# Patient Record
Sex: FEMALE | Race: WHITE
[De-identification: names, ages, dates, MRNs, and addresses within clinical notes are randomized per-mention and may not be internally consistent; named-entity substitution may affect disease eponyms.]

---

## 2019-02-18 ENCOUNTER — HOSPITAL ENCOUNTER (OUTPATIENT)
Dept: HOSPITAL 82 - LAB | Age: 69
Discharge: HOME | End: 2019-02-18
Attending: INTERNAL MEDICINE
Payer: MEDICARE

## 2019-02-18 DIAGNOSIS — E78.2: Primary | ICD-10-CM

## 2019-02-18 DIAGNOSIS — I10: ICD-10-CM

## 2019-02-18 DIAGNOSIS — Z12.11: ICD-10-CM

## 2019-02-18 LAB
ALBUMIN SERPL-MCNC: 4 G/DL (ref 3.2–5)
ALP SERPL-CCNC: 71 U/L (ref 38–126)
ANION GAP SERPL CALCULATED.3IONS-SCNC: 14 MMOL/L
AST SERPL-CCNC: 22 U/L (ref 9–36)
BUN SERPL-MCNC: 25 MG/DL (ref 8–23)
BUN/CREAT SERPL: 27
CHLORIDE SERPL-SCNC: 103 MMOL/L (ref 95–108)
CHOLEST SERPL-MCNC: 152 MG/DL (ref 0–199)
CHOLEST/HDLC SERPL: 3.3 {RATIO}
CO2 SERPL-SCNC: 28 MMOL/L (ref 22–30)
CREAT SERPL-MCNC: 0.9 MG/DL (ref 0.5–1)
HDLC SERPL-MCNC: 46 MG/DL (ref 40–?)
LDLC SERPL CALC-MCNC: 80 MG/DL
POTASSIUM SERPL-SCNC: 3.6 MMOL/L (ref 3.5–5.1)
PROT SERPL-MCNC: 7 G/DL (ref 6.3–8.2)
SODIUM SERPL-SCNC: 142 MMOL/L (ref 137–146)
TRIGL SERPL-MCNC: 130 MG/DL (ref 30–149)
VLDLC SERPL CALC-MCNC: 26 MG/DL

## 2019-03-06 ENCOUNTER — HOSPITAL ENCOUNTER (OUTPATIENT)
Dept: HOSPITAL 82 - MAMMO | Age: 69
Discharge: HOME | End: 2019-03-06
Attending: INTERNAL MEDICINE
Payer: MEDICARE

## 2019-03-06 DIAGNOSIS — Z12.31: Primary | ICD-10-CM

## 2023-09-10 ENCOUNTER — HOSPITAL ENCOUNTER (EMERGENCY)
Dept: HOSPITAL 82 - ED | Age: 73
Discharge: ANOTHER HOSPITAL | End: 2023-09-10
Payer: MEDICARE

## 2023-09-10 VITALS — SYSTOLIC BLOOD PRESSURE: 88 MMHG | DIASTOLIC BLOOD PRESSURE: 36 MMHG

## 2023-09-10 VITALS — SYSTOLIC BLOOD PRESSURE: 78 MMHG | DIASTOLIC BLOOD PRESSURE: 41 MMHG

## 2023-09-10 VITALS — DIASTOLIC BLOOD PRESSURE: 32 MMHG | SYSTOLIC BLOOD PRESSURE: 74 MMHG

## 2023-09-10 VITALS — SYSTOLIC BLOOD PRESSURE: 83 MMHG | DIASTOLIC BLOOD PRESSURE: 39 MMHG

## 2023-09-10 VITALS — DIASTOLIC BLOOD PRESSURE: 47 MMHG | SYSTOLIC BLOOD PRESSURE: 104 MMHG

## 2023-09-10 VITALS — DIASTOLIC BLOOD PRESSURE: 55 MMHG | SYSTOLIC BLOOD PRESSURE: 109 MMHG

## 2023-09-10 VITALS — DIASTOLIC BLOOD PRESSURE: 46 MMHG | SYSTOLIC BLOOD PRESSURE: 95 MMHG

## 2023-09-10 VITALS — SYSTOLIC BLOOD PRESSURE: 86 MMHG | DIASTOLIC BLOOD PRESSURE: 37 MMHG

## 2023-09-10 VITALS — DIASTOLIC BLOOD PRESSURE: 52 MMHG | SYSTOLIC BLOOD PRESSURE: 85 MMHG

## 2023-09-10 VITALS — SYSTOLIC BLOOD PRESSURE: 88 MMHG | DIASTOLIC BLOOD PRESSURE: 44 MMHG

## 2023-09-10 VITALS — DIASTOLIC BLOOD PRESSURE: 44 MMHG | SYSTOLIC BLOOD PRESSURE: 80 MMHG

## 2023-09-10 VITALS — DIASTOLIC BLOOD PRESSURE: 74 MMHG | SYSTOLIC BLOOD PRESSURE: 122 MMHG

## 2023-09-10 VITALS — DIASTOLIC BLOOD PRESSURE: 45 MMHG | SYSTOLIC BLOOD PRESSURE: 77 MMHG

## 2023-09-10 VITALS — SYSTOLIC BLOOD PRESSURE: 95 MMHG | DIASTOLIC BLOOD PRESSURE: 46 MMHG

## 2023-09-10 VITALS — SYSTOLIC BLOOD PRESSURE: 76 MMHG | DIASTOLIC BLOOD PRESSURE: 39 MMHG

## 2023-09-10 VITALS — SYSTOLIC BLOOD PRESSURE: 93 MMHG | DIASTOLIC BLOOD PRESSURE: 39 MMHG

## 2023-09-10 VITALS — SYSTOLIC BLOOD PRESSURE: 68 MMHG | DIASTOLIC BLOOD PRESSURE: 30 MMHG

## 2023-09-10 VITALS — SYSTOLIC BLOOD PRESSURE: 103 MMHG | DIASTOLIC BLOOD PRESSURE: 48 MMHG

## 2023-09-10 DIAGNOSIS — R65.20: ICD-10-CM

## 2023-09-10 DIAGNOSIS — Z20.822: ICD-10-CM

## 2023-09-10 DIAGNOSIS — N17.9: ICD-10-CM

## 2023-09-10 DIAGNOSIS — R79.89: ICD-10-CM

## 2023-09-10 DIAGNOSIS — Z91.81: ICD-10-CM

## 2023-09-10 DIAGNOSIS — K21.9: ICD-10-CM

## 2023-09-10 DIAGNOSIS — J18.9: ICD-10-CM

## 2023-09-10 DIAGNOSIS — I25.10: ICD-10-CM

## 2023-09-10 DIAGNOSIS — M62.82: ICD-10-CM

## 2023-09-10 DIAGNOSIS — I10: ICD-10-CM

## 2023-09-10 DIAGNOSIS — A41.9: Primary | ICD-10-CM

## 2023-09-10 LAB
ALBUMIN SERPL-MCNC: 4 G/DL (ref 3.2–5)
ALP SERPL-CCNC: 62 U/L (ref 38–126)
ANION GAP SERPL CALCULATED.3IONS-SCNC: 16 MMOL/L
APTT PPP: 33.9 SECONDS (ref 20–32.5)
AST SERPL-CCNC: 200 U/L (ref 9–36)
BACTERIA #/AREA URNS HPF: (no result) HPF
BASOPHILS NFR BLD AUTO: 0.1 % (ref 0–3)
BUN SERPL-MCNC: 27 MG/DL (ref 8–23)
BUN/CREAT SERPL: 21
CASTS URNS QL MICRO: (no result) LPF
CHLORIDE SERPL-SCNC: 93 MMOL/L (ref 95–108)
CO2 SERPL-SCNC: 25 MMOL/L (ref 22–30)
CREAT SERPL-MCNC: 1.3 MG/DL (ref 0.5–1)
EOSINOPHIL NFR BLD AUTO: 0 % (ref 0–8)
ERYTHROCYTE [DISTWIDTH] IN BLOOD BY AUTOMATED COUNT: 12.5 % (ref 11.5–15.5)
HCT VFR BLD AUTO: 43.3 % (ref 37–47)
HGB BLD-MCNC: 14.7 G/DL (ref 12–16)
IMM GRANULOCYTES NFR BLD: 0.4 % (ref 0–5)
INR PPP: 1.1 RATIO (ref 0.7–1.3)
KETONES UR STRIP.AUTO-MCNC: 15 MG/DL
LYMPHOCYTES NFR BLD: 1.9 % (ref 15–41)
MCH RBC QN AUTO: 32.1 PG  CALC (ref 26–32)
MCHC RBC AUTO-ENTMCNC: 33.9 G/DL CAL (ref 32–36)
MCV RBC AUTO: 94.5 FL  CALC (ref 80–100)
MONOCYTES NFR BLD AUTO: 5.4 % (ref 2–13)
NEUTROPHILS # BLD AUTO: 17.05 THOU/UL (ref 2–7.15)
NEUTROPHILS NFR BLD AUTO: 92.2 % (ref 42–76)
PH UR STRIP.AUTO: 6 [PH] (ref 4.5–8)
PLATELET # BLD AUTO: 187 THOU/UL (ref 130–400)
POTASSIUM SERPL-SCNC: 3.1 MMOL/L (ref 3.5–5.1)
PROT SERPL-MCNC: 8.1 G/DL (ref 6.3–8.2)
PROT UR QL STRIP.AUTO: >=300 MG/DL
PROTHROMBIN TIME: 10.8 SECONDS (ref 9–12.5)
RBC # BLD AUTO: 4.58 MILL/UL (ref 4.2–5.6)
RBC #/AREA URNS HPF: (no result) RBC/HPF (ref 0–5)
SODIUM SERPL-SCNC: 131 MMOL/L (ref 137–146)
SP GR UR STRIP.AUTO: 1.02
UROBILINOGEN UR QL STRIP.AUTO: 2 E.U./DL
WBC #/AREA URNS HPF: (no result) WBC/HPF (ref 0–5)

## 2023-09-10 PROCEDURE — 0T9B70Z DRAINAGE OF BLADDER WITH DRAINAGE DEVICE, VIA NATURAL OR ARTIFICIAL OPENING: ICD-10-PCS | Performed by: INTERNAL MEDICINE

## 2023-09-17 ENCOUNTER — HOSPITAL ENCOUNTER (INPATIENT)
Dept: HOSPITAL 82 - ED | Age: 73
LOS: 4 days | Discharge: NURSING, REHAB OR LONG-TERM CARE | DRG: 176 | End: 2023-09-21
Attending: STUDENT IN AN ORGANIZED HEALTH CARE EDUCATION/TRAINING PROGRAM | Admitting: STUDENT IN AN ORGANIZED HEALTH CARE EDUCATION/TRAINING PROGRAM
Payer: MEDICARE

## 2023-09-17 VITALS — WEIGHT: 239 LBS | HEIGHT: 63 IN | BODY MASS INDEX: 42.35 KG/M2

## 2023-09-17 DIAGNOSIS — I26.99: Primary | ICD-10-CM

## 2023-09-17 DIAGNOSIS — T82.838A: ICD-10-CM

## 2023-09-17 DIAGNOSIS — R73.03: ICD-10-CM

## 2023-09-17 DIAGNOSIS — E66.9: ICD-10-CM

## 2023-09-17 DIAGNOSIS — Z98.61: ICD-10-CM

## 2023-09-17 DIAGNOSIS — E83.42: ICD-10-CM

## 2023-09-17 DIAGNOSIS — I10: ICD-10-CM

## 2023-09-17 DIAGNOSIS — M79.89: ICD-10-CM

## 2023-09-17 DIAGNOSIS — R79.89: ICD-10-CM

## 2023-09-17 DIAGNOSIS — R09.02: ICD-10-CM

## 2023-09-17 DIAGNOSIS — I25.10: ICD-10-CM
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
## 2023-09-17 DIAGNOSIS — K21.9: ICD-10-CM

## 2023-09-17 LAB
ALBUMIN SERPL-MCNC: 2.9 G/DL (ref 3.2–5)
ALP SERPL-CCNC: 82 U/L (ref 38–126)
ANION GAP SERPL CALCULATED.3IONS-SCNC: 8 MMOL/L
AST SERPL-CCNC: 240 U/L (ref 9–36)
BASOPHILS NFR BLD AUTO: 1.2 % (ref 0–3)
BUN SERPL-MCNC: 21 MG/DL (ref 8–23)
BUN/CREAT SERPL: 25
CHLORIDE SERPL-SCNC: 105 MMOL/L (ref 95–108)
CK SERPL-CCNC: 468 U/L (ref 30–135)
CO2 SERPL-SCNC: 29 MMOL/L (ref 22–30)
CREAT SERPL-MCNC: 0.8 MG/DL (ref 0.5–1)
EOSINOPHIL NFR BLD AUTO: 0 % (ref 0–8)
ERYTHROCYTE [DISTWIDTH] IN BLOOD BY AUTOMATED COUNT: 14.2 % (ref 11.5–15.5)
HCT VFR BLD AUTO: 35.2 % (ref 37–47)
HGB BLD-MCNC: 11.4 G/DL (ref 12–16)
IMM GRANULOCYTES NFR BLD: 7.5 % (ref 0–5)
LIPASE SERPL-CCNC: 110 U/L (ref 23–300)
LYMPHOCYTES NFR BLD: 14 % (ref 15–41)
MAGNESIUM SERPL-MCNC: 1.2 MG/DL (ref 1.6–2.3)
MCH RBC QN AUTO: 32.1 PG  CALC (ref 26–32)
MCHC RBC AUTO-ENTMCNC: 32.4 G/DL CAL (ref 32–36)
MCV RBC AUTO: 99.2 FL  CALC (ref 80–100)
MONOCYTES NFR BLD AUTO: 5.2 % (ref 2–13)
NEUTROPHILS # BLD AUTO: 7.69 THOU/UL (ref 2–7.15)
NEUTROPHILS NFR BLD AUTO: 72.1 % (ref 42–76)
PLATELET # BLD AUTO: 263 THOU/UL (ref 130–400)
POTASSIUM SERPL-SCNC: 3.9 MMOL/L (ref 3.5–5.1)
PROT SERPL-MCNC: 6.1 G/DL (ref 6.3–8.2)
RBC # BLD AUTO: 3.55 MILL/UL (ref 4.2–5.6)
SODIUM SERPL-SCNC: 138 MMOL/L (ref 137–146)

## 2023-09-17 PROCEDURE — A9540 TC99M MAA: HCPCS

## 2023-09-17 NOTE — NUR
PT RESTING IN BED W/ EYES CLOSED. EASILY AROUSABLE. RESP EVEN AND UNLABORED. NO
DISTRESS NOTED. VSS. SKIN W/P/D. INFORMED PT OF PLAN OF CARE AND WAIT TIME AND
SHE VERBALIZED UNDERSTANDING. CALL LIGHT IN REACH.

## 2023-09-18 VITALS — SYSTOLIC BLOOD PRESSURE: 133 MMHG | DIASTOLIC BLOOD PRESSURE: 46 MMHG

## 2023-09-18 VITALS — DIASTOLIC BLOOD PRESSURE: 52 MMHG | SYSTOLIC BLOOD PRESSURE: 133 MMHG

## 2023-09-18 VITALS — SYSTOLIC BLOOD PRESSURE: 114 MMHG | DIASTOLIC BLOOD PRESSURE: 50 MMHG

## 2023-09-18 VITALS — DIASTOLIC BLOOD PRESSURE: 52 MMHG | SYSTOLIC BLOOD PRESSURE: 140 MMHG

## 2023-09-18 VITALS — SYSTOLIC BLOOD PRESSURE: 110 MMHG | DIASTOLIC BLOOD PRESSURE: 46 MMHG

## 2023-09-18 PROCEDURE — B548ZZA ULTRASONOGRAPHY OF SUPERIOR VENA CAVA, GUIDANCE: ICD-10-PCS | Performed by: FAMILY MEDICINE

## 2023-09-18 PROCEDURE — 02HV33Z INSERTION OF INFUSION DEVICE INTO SUPERIOR VENA CAVA, PERCUTANEOUS APPROACH: ICD-10-PCS | Performed by: FAMILY MEDICINE

## 2023-09-18 PROCEDURE — 05HY33Z INSERTION OF INFUSION DEVICE INTO UPPER VEIN, PERCUTANEOUS APPROACH: ICD-10-PCS | Performed by: EMERGENCY MEDICINE

## 2023-09-18 NOTE — NUR
PATIENT ADMITTED TO [facility] VIA STRETCHER. ALERT AND ABLE TO MAKE NEEDS KNOWN.
PATIENT TRANSFERRED TO BED WITH STAND BY ASSIST. PATIENT DENIES NEEDING
ANYTHING AT THIS TIME. BED IN LOW POSITION. DID ORIENT PATIENT TO CALL LIGHT.

## 2023-09-18 NOTE — NUR
PT BACK FROM CT AND NEW IV PLACED IN LAC, NOT PATENT. NO INFILTRATE NOTED. NO
REDNESS/SWELLING AFTER INJECTION OF CT CONTRAST. MD NOTIFIED AND AWARE OF
INABILITY TO INITIATE IV.

## 2023-09-18 NOTE — NUR
PATIENT IS RESTING CENTRAL LINE DRESSING INTACT FLUSHED WITHOUT ANY ISSUES.
PATIENT WAS PLACED ON NC 2L DUE TO O2 READING 90% NOW ITS 95%.
CALL LIGHT WITHIN REACH. PLAN OF CARE ONGOING.

## 2023-09-18 NOTE — NUR
PT CONTINUES RESTING IN BED W/ EYES CLOSED. EASILY AROUSABLE. RESP REMAIN EVEN
AND UNLABORED. AWAITING IV ACCESS FOR MEDICATION ADMINISTRATION AND ADMISSION
TO HOSPITAL.

## 2023-09-18 NOTE — NUR
PATIENT SITTING UP IN BED AT THIS TIME-O2 VIA NASAL CANNULA IN PLACE AT 2LPM.
O2 SAT IS 95%. AWAKE ALERT AND ORIENTEDX3. PATIENT WITH NO COMPLAINTS AT THIS
TIME AND STATES THAT SHE IS FEELING SOME BETTER. STATES THAT SHE WOULD LIKE TO
GO TO REHAB AFTER SHE LEAVES HERE-STATES THAT SHE IS VERY WEAK AFTER BEING IN
THE HOSPITAL AT [facility] AND NOW BACK IN THE HOSPITAL  HERE. STATES THAT SHE DOES
LIVE ALONE AND NO FAMILY LOCALLY. TELE MONITOR IN PLACE READING SER-70'S.
PUREWICK IN PLACE AND DRAINING  YELLOW URINE. PATIENT WITH RIGHT IJ TLC
INTACT-SALINE LOCK. LUNGS ARE CLEAR AT THIS TIME. ABD IS SOFT WITH ACTIVE BS.
LAST BM WAS TODAY. SAFETY PRECAUTIONS REINFORCED. CALL LIGHT IN REACH. WILL
CONT TO MONITOR.

## 2023-09-18 NOTE — NUR
THIS RN TRIED 3X WITH IV ULTRASOUND IN AC OR HIGHER FOR ACCESS; PTS BLOOD WAS
CLOTTING AS SOON AS CATHETHER HIT VEIN AND I WAS UNABLE TO PULL OR FLUSH
CATHETER DUE TO THIS. MD NOTIFIED.

## 2023-09-18 NOTE — NUR
MULTIPLE EKGS WERE DONE AFTER FIRST TROP WAS POSITIVE. SECOND TROP NEGATIVE.
CARDIOLOGY WAS CONSULT AND SAW PT OVER THE MONITOR. CALL LIGHT WITHIN REACH.
PLAN OF CARE ONGOING.

## 2023-09-18 NOTE — NUR
PT CONTINUES RESTING IN BED W/ EYES CLOSED, EASILY AROUSABLE. AWAITING IV
ACCESS FOR MEDICATION ADMINISTRATION AND ADMISSION.

## 2023-09-18 NOTE — NUR
CALLED ON CALL PROVIDER ON PATIENTS CRITICAL TROPONIN. PROVIDER ORDERED EKG
AND CONSULT FOR INTERVENTIONAL RADIOLOGY FOR IV ACCESS.

## 2023-09-18 NOTE — NUR
ZIGGY RN CHARGE NURSE BEDSIDE. IV ATTEMPTS X 2 UNSUCCESSFUL. MD NOTIFIED AND
AWARE. CENTRAL LINE IS AGAIN REQUESTED AND MD AGAIN STATES THAT THE "PT DOES
NOT NEED A CENTRAL LINE". MD AGAIN INFORMED OF INABILITY TO ESTABLISH AND IV
AND MULTIPLE ATTEMPTS AND HE STATES THAT HE WILL ATTEMPT A PERIPHERAL LINE.

## 2023-09-18 NOTE — NUR
UNABLE TO DRAW BLOOD FROM [facility]. PT C/O PAIN TO SITE. FLUSHED W/ SOME RESISTANCE
BY MARY TRINH RN AND SWELLING NOTED. XRAY REPORTS NO VISUALIZATION OF
CATHETER. MD NOTIFIED AND LINE PULLED. #22 INSERTED IN LT HAND AND REPEAT LABS
DRAWN. PT TOLERATED WELL.

## 2023-09-18 NOTE — NUR
LITZY MAY ATTEMPTED TO INITIATE IV X 2 USING US MACHINE AND WAS
UNSUCCESSFUL. MD AGAIN INFORMED OF INABILITY TO ESTABLISH IV ACCESS AND CENTRAL
LINE REQUESTED. MD STATES THAT PT "DOES NOT NEED A CENTRAL LINE" AND TO "TRY
AGAIN".

## 2023-09-19 VITALS — SYSTOLIC BLOOD PRESSURE: 152 MMHG | DIASTOLIC BLOOD PRESSURE: 59 MMHG

## 2023-09-19 VITALS — DIASTOLIC BLOOD PRESSURE: 39 MMHG | SYSTOLIC BLOOD PRESSURE: 121 MMHG

## 2023-09-19 VITALS — DIASTOLIC BLOOD PRESSURE: 52 MMHG | SYSTOLIC BLOOD PRESSURE: 125 MMHG

## 2023-09-19 VITALS — SYSTOLIC BLOOD PRESSURE: 137 MMHG | DIASTOLIC BLOOD PRESSURE: 66 MMHG

## 2023-09-19 VITALS — DIASTOLIC BLOOD PRESSURE: 52 MMHG | SYSTOLIC BLOOD PRESSURE: 129 MMHG

## 2023-09-19 VITALS — DIASTOLIC BLOOD PRESSURE: 57 MMHG | SYSTOLIC BLOOD PRESSURE: 135 MMHG

## 2023-09-19 LAB
ALBUMIN SERPL-MCNC: 2.3 G/DL (ref 3.2–5)
ALP SERPL-CCNC: 67 U/L (ref 38–126)
ANION GAP SERPL CALCULATED.3IONS-SCNC: 4 MMOL/L
AST SERPL-CCNC: 107 U/L (ref 9–36)
BASOPHILS NFR BLD AUTO: 0.3 % (ref 0–3)
BUN SERPL-MCNC: 21 MG/DL (ref 8–23)
BUN/CREAT SERPL: 23
CHLORIDE SERPL-SCNC: 108 MMOL/L (ref 95–108)
CO2 SERPL-SCNC: 30 MMOL/L (ref 22–30)
CREAT SERPL-MCNC: 0.9 MG/DL (ref 0.5–1)
EOSINOPHIL NFR BLD AUTO: 0 % (ref 0–8)
ERYTHROCYTE [DISTWIDTH] IN BLOOD BY AUTOMATED COUNT: 14.6 % (ref 11.5–15.5)
HCT VFR BLD AUTO: 31.8 % (ref 37–47)
HGB BLD-MCNC: 10.2 G/DL (ref 12–16)
IMM GRANULOCYTES NFR BLD: 5 % (ref 0–5)
LYMPHOCYTES NFR BLD: 25.8 % (ref 15–41)
MAGNESIUM SERPL-MCNC: 1.7 MG/DL (ref 1.6–2.3)
MCH RBC QN AUTO: 32.4 PG  CALC (ref 26–32)
MCHC RBC AUTO-ENTMCNC: 32.1 G/DL CAL (ref 32–36)
MCV RBC AUTO: 101 FL  CALC (ref 80–100)
MONOCYTES NFR BLD AUTO: 5.4 % (ref 2–13)
NEUTROPHILS # BLD AUTO: 5.72 THOU/UL (ref 2–7.15)
NEUTROPHILS NFR BLD AUTO: 63.5 % (ref 42–76)
PLATELET # BLD AUTO: 306 THOU/UL (ref 130–400)
POTASSIUM SERPL-SCNC: 3.9 MMOL/L (ref 3.5–5.1)
PROT SERPL-MCNC: 5.2 G/DL (ref 6.3–8.2)
RBC # BLD AUTO: 3.15 MILL/UL (ref 4.2–5.6)
SODIUM SERPL-SCNC: 138 MMOL/L (ref 137–146)

## 2023-09-19 NOTE — NUR
PATIENT RESTING NURSE OBSERVED A NEW ONSET OF COUGH. PATIENT STATED THAT SHE
IS HAVING A PRODUCTIVE COUGH CLEAR MINIMAL AMOUNT. WILL INFORM MD. VIDEO
MONITOR WITH CARDIOLOGY AWAITING AT PATIENT BEDSIDE.

## 2023-09-19 NOTE — NUR
CALLED [employer] REGARDING FAX MADE HERMELINDAEMPT TO RECEIVE RECORDS. MANAS STATED
DIDNT SEE THE ONE FROM YESTERDAY BUT GOT THE ONE THAT I JUST FAXED. STATED SHE
IS WORKING ON IT NOW AND SEND IT TO THE FAX NUMBER.

## 2023-09-19 NOTE — NUR
RESTING IN BED WITH O2 VIA NASAL CANNULA IN PLACE. EYES ARE CLOSED AND RESPS
ARE EVEN AND UNLABORED. TELE MONITOR IN PLACE. PUREWICK IN PLACE. CALL LIGHT
IN REACH. WILL CONT TO MONITOR.

## 2023-09-19 NOTE — NUR
PATIENT WAWAKE WATCHING TV AT THIS TIME. O2 VIA NASAL CANNULA IN PLACE AT
2LPM. TELE MONITOR IN PLACE. NO COMPLAINTS AT THIS TIME. LAB WORK DRAWN FROM
RIGHT IJ BROWN PORT WITH GOOD BLOOD RETURN. FLUSHED PER PROTOCOL. WHITE AND
BLUE PORT ALSO FLUSHED WITH GOOD BLOOD RETURN. CALL LIGHT IN REACH. WILL CONT
TO MONITOR.

## 2023-09-20 VITALS — SYSTOLIC BLOOD PRESSURE: 136 MMHG | DIASTOLIC BLOOD PRESSURE: 58 MMHG

## 2023-09-20 VITALS — DIASTOLIC BLOOD PRESSURE: 65 MMHG | SYSTOLIC BLOOD PRESSURE: 140 MMHG

## 2023-09-20 VITALS — SYSTOLIC BLOOD PRESSURE: 130 MMHG | DIASTOLIC BLOOD PRESSURE: 53 MMHG

## 2023-09-20 VITALS — SYSTOLIC BLOOD PRESSURE: 140 MMHG | DIASTOLIC BLOOD PRESSURE: 53 MMHG

## 2023-09-20 VITALS — DIASTOLIC BLOOD PRESSURE: 37 MMHG | SYSTOLIC BLOOD PRESSURE: 120 MMHG

## 2023-09-20 LAB
ALBUMIN SERPL-MCNC: 2.4 G/DL (ref 3.2–5)
ALP SERPL-CCNC: 61 U/L (ref 38–126)
ANION GAP SERPL CALCULATED.3IONS-SCNC: 4 MMOL/L
AST SERPL-CCNC: 63 U/L (ref 9–36)
BASOPHILS NFR BLD AUTO: 0.2 % (ref 0–3)
BUN SERPL-MCNC: 19 MG/DL (ref 8–23)
BUN/CREAT SERPL: 26
CHLORIDE SERPL-SCNC: 112 MMOL/L (ref 95–108)
CO2 SERPL-SCNC: 28 MMOL/L (ref 22–30)
CREAT SERPL-MCNC: 0.7 MG/DL (ref 0.5–1)
EOSINOPHIL NFR BLD AUTO: 1.2 % (ref 0–8)
ERYTHROCYTE [DISTWIDTH] IN BLOOD BY AUTOMATED COUNT: 14.4 % (ref 11.5–15.5)
HCT VFR BLD AUTO: 31.3 % (ref 37–47)
HGB BLD-MCNC: 9.8 G/DL (ref 12–16)
IMM GRANULOCYTES NFR BLD: 2.3 % (ref 0–5)
LYMPHOCYTES NFR BLD: 28.6 % (ref 15–41)
MCH RBC QN AUTO: 32 PG  CALC (ref 26–32)
MCHC RBC AUTO-ENTMCNC: 31.3 G/DL CAL (ref 32–36)
MCV RBC AUTO: 102.3 FL  CALC (ref 80–100)
MONOCYTES NFR BLD AUTO: 6.7 % (ref 2–13)
NEUTROPHILS # BLD AUTO: 5.08 THOU/UL (ref 2–7.15)
NEUTROPHILS NFR BLD AUTO: 61 % (ref 42–76)
PH UR STRIP.AUTO: 6 [PH] (ref 4.5–8)
PLATELET # BLD AUTO: 284 THOU/UL (ref 130–400)
POTASSIUM SERPL-SCNC: 3.6 MMOL/L (ref 3.5–5.1)
PROT SERPL-MCNC: 5.2 G/DL (ref 6.3–8.2)
RBC # BLD AUTO: 3.06 MILL/UL (ref 4.2–5.6)
SODIUM SERPL-SCNC: 140 MMOL/L (ref 137–146)
SP GR UR STRIP.AUTO: 1.02
UROBILINOGEN UR QL STRIP.AUTO: 1 E.U./DL

## 2023-09-20 NOTE — NUR
RECEIVED BEDSIDE REPORT FROM [facility]IS [facility]. PT IS RESTING WITH EYES CLOSED. ALL
SAFETY MEASURES IN PLACE. VSS. NO NEEDS AT THIS TIME.

## 2023-09-20 NOTE — NUR
RECEIVED BEDSIDE REPORT FROM DAYSHIFT NURSE. PT IS SITTING UP IN BED. PT HAS
NO COMPLAINTS OF PAIN AT THIS TIME. PT WAS MADE AWARE OF CHANGE OF NURSE FOR
THE NIGHT. SAFETY PRECAUTIONS IN PLACE AND CALL LIGHT WITHIN REACH.

## 2023-09-20 NOTE — NUR
PT REPORTED BLEEDING UNDER HER CENTRAL LINE DRESSING. DRESSING REMOVED, AREA
CLEANED AND NEW DRESSING PLACED. PT TOLERATED WELL.

## 2023-09-21 VITALS — SYSTOLIC BLOOD PRESSURE: 135 MMHG | DIASTOLIC BLOOD PRESSURE: 52 MMHG

## 2023-09-21 VITALS — DIASTOLIC BLOOD PRESSURE: 48 MMHG | SYSTOLIC BLOOD PRESSURE: 131 MMHG

## 2023-09-21 VITALS — DIASTOLIC BLOOD PRESSURE: 50 MMHG | SYSTOLIC BLOOD PRESSURE: 128 MMHG

## 2023-09-21 VITALS — DIASTOLIC BLOOD PRESSURE: 40 MMHG | SYSTOLIC BLOOD PRESSURE: 113 MMHG

## 2023-09-21 LAB
ALBUMIN SERPL-MCNC: 2.8 G/DL (ref 3.2–5)
ALP SERPL-CCNC: 69 U/L (ref 38–126)
ANION GAP SERPL CALCULATED.3IONS-SCNC: 8 MMOL/L
AST SERPL-CCNC: 64 U/L (ref 9–36)
BASOPHILS NFR BLD AUTO: 0.6 % (ref 0–3)
BUN SERPL-MCNC: 16 MG/DL (ref 8–23)
BUN/CREAT SERPL: 25
CHLORIDE SERPL-SCNC: 104 MMOL/L (ref 95–108)
CO2 SERPL-SCNC: 31 MMOL/L (ref 22–30)
CREAT SERPL-MCNC: 0.7 MG/DL (ref 0.5–1)
EOSINOPHIL NFR BLD AUTO: 1.1 % (ref 0–8)
ERYTHROCYTE [DISTWIDTH] IN BLOOD BY AUTOMATED COUNT: 14.6 % (ref 11.5–15.5)
HCT VFR BLD AUTO: 33.5 % (ref 37–47)
HGB BLD-MCNC: 10.8 G/DL (ref 12–16)
IMM GRANULOCYTES NFR BLD: 1.5 % (ref 0–5)
LYMPHOCYTES NFR BLD: 32.4 % (ref 15–41)
MCH RBC QN AUTO: 32.7 PG  CALC (ref 26–32)
MCHC RBC AUTO-ENTMCNC: 32.2 G/DL CAL (ref 32–36)
MCV RBC AUTO: 101.5 FL  CALC (ref 80–100)
MONOCYTES NFR BLD AUTO: 7.1 % (ref 2–13)
NEUTROPHILS # BLD AUTO: 4.49 THOU/UL (ref 2–7.15)
NEUTROPHILS NFR BLD AUTO: 57.3 % (ref 42–76)
PLATELET # BLD AUTO: 313 THOU/UL (ref 130–400)
POTASSIUM SERPL-SCNC: 3.7 MMOL/L (ref 3.5–5.1)
PROT SERPL-MCNC: 5.8 G/DL (ref 6.3–8.2)
RBC # BLD AUTO: 3.3 MILL/UL (ref 4.2–5.6)
SODIUM SERPL-SCNC: 139 MMOL/L (ref 137–146)

## 2023-09-21 NOTE — NUR
PT IS SLEEPING COMFORTABLY IN BED. PT SHOWS NO SIGNS OF PAIN OR DISTRESS AT
THE MOMENT. SAFETY PRECAUTIONS IN PLACE AND CALL LIGHT WITHIN REACH.